# Patient Record
Sex: MALE | Race: WHITE | NOT HISPANIC OR LATINO | URBAN - METROPOLITAN AREA
[De-identification: names, ages, dates, MRNs, and addresses within clinical notes are randomized per-mention and may not be internally consistent; named-entity substitution may affect disease eponyms.]

---

## 2024-09-17 ENCOUNTER — EMERGENCY (EMERGENCY)
Facility: HOSPITAL | Age: 54
LOS: 1 days | Discharge: HOME | End: 2024-09-17
Attending: EMERGENCY MEDICINE | Admitting: EMERGENCY MEDICINE
Payer: OTHER MISCELLANEOUS

## 2024-09-17 VITALS
WEIGHT: 220.02 LBS | HEART RATE: 69 BPM | OXYGEN SATURATION: 96 % | SYSTOLIC BLOOD PRESSURE: 159 MMHG | RESPIRATION RATE: 16 BRPM | DIASTOLIC BLOOD PRESSURE: 104 MMHG | TEMPERATURE: 98 F

## 2024-09-17 VITALS
DIASTOLIC BLOOD PRESSURE: 92 MMHG | OXYGEN SATURATION: 98 % | HEART RATE: 65 BPM | RESPIRATION RATE: 18 BRPM | SYSTOLIC BLOOD PRESSURE: 165 MMHG

## 2024-09-17 PROCEDURE — 99053 MED SERV 10PM-8AM 24 HR FAC: CPT

## 2024-09-17 PROCEDURE — 99284 EMERGENCY DEPT VISIT MOD MDM: CPT

## 2024-09-17 PROCEDURE — 73030 X-RAY EXAM OF SHOULDER: CPT | Mod: 26,LT

## 2024-09-17 RX ORDER — IBUPROFEN 600 MG
600 TABLET ORAL ONCE
Refills: 0 | Status: COMPLETED | OUTPATIENT
Start: 2024-09-17 | End: 2024-09-17

## 2024-09-17 RX ADMIN — Medication 600 MILLIGRAM(S): at 05:44

## 2024-09-17 NOTE — ED PROVIDER NOTE - PHYSICAL EXAMINATION
Gen: Well appearing  Extremity: No deformity, tenderness to anterior shoulder, difficulty raising arm above head, able to resist downward traction with arm extended but with pain, 2+ distal pulses, normal elbow and wrist exam
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
## 2024-09-17 NOTE — ED PROVIDER NOTE - PATIENT PORTAL LINK FT
You can access the FollowMyHealth Patient Portal offered by [facility] by registering at the following website: [email or web address]. By joining [email or web address]’s FollowMyHealth portal, you will also be able to view your health information using other applications (apps) compatible with our system.

## 2024-09-17 NOTE — ED ADULT TRIAGE NOTE - GLASGOW COMA SCALE: BEST VERBAL RESPONSE, MLM
Please notify patient: Blood glucose 160, well controlled. Anemia stable.   Otherwise labs within normal limits  continue current treatment    Future Appointments  11/4/2021  4:00 PM    Sourav Toribio DPM     [facility]  2/24/2022  3:00 PM    Annette Niño MD     [facility]  6/2/2022   3:30 PM    Annette Niño MD     [facility]  7/15/2022  1:00 PM    Iliana Farley MD     [place] [id number] (V5) oriented

## 2024-09-17 NOTE — ED PROVIDER NOTE - NSFOLLOWUPINSTRUCTIONS_ED_ALL_ED_FT
Rotator Cuff Tear    A rotator cuff tear is a partial or complete tear of the cord-like bands (tendons) that connect muscle to bone in the rotator cuff. The rotator cuff is a group of muscles and tendons that surround the shoulder joint and keep the upper arm bone (humerus) in the shoulder socket.    The tear can occur suddenly (acute tear) or can develop over a long period of time (chronic tear).    What are the causes?  Acute tears may be caused by:  A fall, especially on an outstretched arm.  Lifting very heavy objects with a jerking motion.  Chronic tears may be caused by overuse of the muscles. This may happen in sports, physical work, or activities in which your arm repeatedly moves over your head.    What increases the risk?  This condition is more likely to occur in:  Athletes and workers who frequently use their shoulder or reach over their head. This may include activities such as:  Tennis.  Baseball and softball.  Swimming and rowing.  Weight lifting.  Construction work.  Painting.  People who smoke.  Older people who have arthritis or poor blood supply. These can make the muscles and tendons weaker.  What are the signs or symptoms?  Symptoms of this condition depend on the type and severity of the injury:  An acute tear may include a sudden tearing feeling, followed by severe pain that goes from your upper shoulder, down your arm, and toward your elbow.  A chronic tear includes a gradual weakness and decreased shoulder motion as the pain gets worse. The pain is usually worse at night.  Both types may have symptoms such as:  Pain that spreads (radiates) from the shoulder to the upper arm.  Swelling and tenderness in front of the shoulder.  Decreased range of motion.  Pain when:  Reaching, pulling, or lifting the arm above the head.  Lowering the arm from above the head.  Not being able to raise your arm out to the side.  Difficulty placing the arm behind your back.  How is this diagnosed?  This condition is diagnosed with a medical history and physical exam. Imaging tests may also be done, including:  X-rays.  MRI.  Ultrasound.  CT or MR arthrogram. During this test, a contrast material is injected into your shoulder, and then images are taken.  How is this treated?  Treatment for this condition depends on the type and severity of the condition. In less severe cases, treatment may include:  Rest. This may be done with a sling that holds the shoulder still (immobilization). Your health care provider may also recommend avoiding activities that involve lifting your arm over your head.  Icing the shoulder.  Anti-inflammatory medicines, such as aspirin or ibuprofen.  Strengthening and stretching exercises. Your health care provider may recommend specific exercises to improve your range of motion and strengthen your shoulder.  In more severe cases, treatment may include:  Physical therapy.  Steroid injections.  Surgery.  Follow these instructions at home:  Managing pain, stiffness, and swelling        If directed, put ice on the injured area. To do this:  If you have a removable sling, remove it as told by your health care provider.  Put ice in a plastic bag.  Place a towel between your skin and the bag.  Leave the ice on for 20 minutes, 2–3 times a day.  Remove the ice if your skin turns bright red. This is very important. If you cannot feel pain, heat, or cold, you have a greater risk of damage to the area.  Raise (elevate) the injured area above the level of your heart while you are lying down.  Find a comfortable sleeping position or sleep on a recliner, if available.  Move your fingers often to reduce stiffness and swelling.  Once the swelling has gone down, your health care provider may direct you to apply heat to relax the muscles. Use the heat source that your health care provider recommends, such as a moist heat pack or a heating pad.  Place a towel between your skin and the heat source.  Leave the heat on for 20–30 minutes.  Remove the heat if your skin turns bright red. This is especially important if you are unable to feel pain, heat, or cold. You have a greater risk of getting burned.  If you have a sling:    Wear the sling as told by your health care provider. Remove it only as told by your health care provider.  Loosen the sling if your fingers tingle, become numb, or turn cold and blue.  Keep the sling clean.  If the sling is not waterproof:  Do not let it get wet.  Cover it with a watertight covering when you take a bath or a shower.  Activity    Rest your shoulder as told by your health care provider.  Return to your normal activities as told by your health care provider. Ask your health care provider what activities are safe for you.  Ask your health care provider when it is safe to drive if you have a sling on your arm.  Do any exercises or stretches as told by your health care provider.  General instructions    Take over-the-counter and prescription medicines only as told by your health care provider.  Do not use any products that contain nicotine or tobacco, such as cigarettes, e-cigarettes, and chewing tobacco. If you need help quitting, ask your health care provider.  Keep all follow-up visits. This is important.  Contact a health care provider if:  Your pain gets worse.  You have new pain in your arm, hands, or fingers.  Medicine does not help your pain.  Get help right away if:  Your arm, hand, or fingers are numb or tingling.  Your arm, hand, or fingers are swollen or painful or they turn white or blue.  Your hand or fingers on your injured arm are colder than your other hand.  Summary  A rotator cuff tear is a partial or complete tear of the cord-like bands (tendons) that connect muscle to bone in the rotator cuff.  The tear can occur suddenly (acute tear) or can develop over a long period of time (chronic tear).  Treatment generally includes rest, anti-inflammatory medicines, and icing. In some cases, physical therapy and steroid injections may be needed. In severe cases, surgery may be needed.  This information is not intended to replace advice given to you by your health care provider. Make sure you discuss any questions you have with your health care provider.    Document Revised: 04/21/2021 Document Reviewed: 04/21/2021  Elsevier Patient Education © 2024 [employer]
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
## 2024-09-17 NOTE — ED PROVIDER NOTE - PROGRESS NOTE DETAILS
Advised patient of results and recommended that he follow-up with orthopedics as an outpatient.  Recommending NSAIDs, ice.  All questions answered, well-appearing upon discharge.

## 2024-09-17 NOTE — ED PROVIDER NOTE - CARE PROVIDER_API CALL
Marco Alexandra  Orthopaedic Sports Medicine  [address]  [place] [address]  Phone: [phone]  Fax: [phone]  Follow Up Time:

## 2024-09-20 DIAGNOSIS — S49.92XA UNSPECIFIED INJURY OF LEFT SHOULDER AND UPPER ARM, INITIAL ENCOUNTER: ICD-10-CM
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
## 2024-09-20 DIAGNOSIS — Z88.0 ALLERGY STATUS TO PENICILLIN: ICD-10-CM
